# Patient Record
Sex: FEMALE | Race: WHITE | Employment: UNEMPLOYED | URBAN - METROPOLITAN AREA
[De-identification: names, ages, dates, MRNs, and addresses within clinical notes are randomized per-mention and may not be internally consistent; named-entity substitution may affect disease eponyms.]

---

## 2019-04-29 ENCOUNTER — APPOINTMENT (OUTPATIENT)
Dept: CT IMAGING | Age: 84
End: 2019-04-29
Payer: MEDICARE

## 2019-04-29 ENCOUNTER — APPOINTMENT (OUTPATIENT)
Dept: GENERAL RADIOLOGY | Age: 84
End: 2019-04-29
Payer: MEDICARE

## 2019-04-29 ENCOUNTER — HOSPITAL ENCOUNTER (EMERGENCY)
Age: 84
Discharge: HOME | End: 2019-04-29
Attending: EMERGENCY MEDICINE
Payer: MEDICARE

## 2019-04-29 VITALS
DIASTOLIC BLOOD PRESSURE: 96 MMHG | HEIGHT: 60 IN | HEART RATE: 98 BPM | TEMPERATURE: 97.1 F | OXYGEN SATURATION: 97 % | WEIGHT: 160 LBS | BODY MASS INDEX: 31.41 KG/M2 | RESPIRATION RATE: 20 BRPM | SYSTOLIC BLOOD PRESSURE: 157 MMHG
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
## 2019-04-29 DIAGNOSIS — S80.01XA CONTUSION OF RIGHT KNEE, INITIAL ENCOUNTER: ICD-10-CM

## 2019-04-29 DIAGNOSIS — S00.83XA CONTUSION OF FACE, INITIAL ENCOUNTER: ICD-10-CM

## 2019-04-29 DIAGNOSIS — S09.90XA CLOSED HEAD INJURY, INITIAL ENCOUNTER: ICD-10-CM

## 2019-04-29 PROCEDURE — 99284 EMERGENCY DEPT VISIT MOD MDM: CPT

## 2019-04-29 PROCEDURE — 93005 ELECTROCARDIOGRAM TRACING: CPT | Performed by: PHYSICIAN ASSISTANT

## 2019-04-29 PROCEDURE — 73564 X-RAY EXAM KNEE 4 OR MORE: CPT

## 2019-04-29 PROCEDURE — 6370000000 HC RX 637 (ALT 250 FOR IP): Performed by: PHYSICIAN ASSISTANT

## 2019-04-29 PROCEDURE — 72125 CT NECK SPINE W/O DYE: CPT

## 2019-04-29 PROCEDURE — 70450 CT HEAD/BRAIN W/O DYE: CPT

## 2019-04-29 RX ORDER — DIAPER,BRIEF,INFANT-TODD,DISP
EACH MISCELLANEOUS ONCE
Status: COMPLETED | OUTPATIENT
Start: 2019-04-29 | End: 2019-04-29

## 2019-04-29 RX ADMIN — BACITRACIN ZINC: 500 OINTMENT TOPICAL at 10:29

## 2019-04-29 ASSESSMENT — PAIN SCALES - GENERAL: PAINLEVEL_OUTOF10: 5

## 2019-04-29 ASSESSMENT — PAIN DESCRIPTION - PAIN TYPE: TYPE: ACUTE PAIN

## 2019-04-29 ASSESSMENT — PAIN DESCRIPTION - LOCATION: LOCATION: HEAD

## 2019-04-29 NOTE — ED NOTES
Discharge instructions given and pt verbalized understanding. Gait steady. No distress noted.      Tim Morrison, MICHEL  04/29/19 3665

## 2019-04-29 NOTE — PROGRESS NOTES
Patient's jewelry removed and placed in an envelope. Envelope given to patient. Patient left CT department with jewelry. ( 1 pair of earrings,necklace w/ charm)

## 2019-04-29 NOTE — ED PROVIDER NOTES
ED Attending  CC: Malini     Department of Emergency Medicine   ED  Provider Note  Admit Date/RoomTime: 4/29/2019  9:05 AM  ED Room: 21/21  Chief Complaint   Fall (lost balance and fell in kitchen hitting head on floor. denies LOC)    History of Present Illness   Source of history provided by:  patient. History/Exam Limitations: none. Ammon Waller is a 80 y.o. old female who has a past medical history of:   Past Medical History:   Diagnosis Date    Hypertension     presents to the emergency department by private vehicle, ambulatory and accompanied by family members, for a fall which occured 1 hour(s) prior to arrival. She was reportedly walking while at home prior to incident with complaints of right forehead contusion. Since onset the symptoms have been moderate in degree. Her pain is aggraveated by movement, use and palpation and relieved by nothing. She denies any loss of consciousness, neck pain, chest pain, abdominal pain, numbness or weakness. Patient denies any blood thinners. No LOC. Remona Mellow ROS    Pertinent positives and negatives are stated within HPI, all other systems reviewed and are negative. Past Surgical History:   Procedure Laterality Date    JOINT REPLACEMENT     Social History:  reports that she has never smoked. She has never used smokeless tobacco. She reports that she does not drink alcohol or use drugs. Family History: family history is not on file. Allergies: Patient has no known allergies. Physical Exam   Oxygen Saturation Interpretation: Normal.  ED Triage Vitals   BP Temp Temp Source Pulse Resp SpO2 Height Weight   04/29/19 0907 04/29/19 0859 04/29/19 0907 04/29/19 0859 04/29/19 0907 04/29/19 0859 04/29/19 0907 04/29/19 0907   (!) 183/111 97.5 °F (36.4 °C) Infrared 103 20 97 % 5' (1.524 m) 160 lb (72.6 kg)       Physical Exam  · Constitutional:  Alert, development consistent with age. · HEENT:  NC/NT. ARA. EOMI. Right frontal cephalohematoma with superficial abrasion. Airway patent. · Neck:  No midline or paravertebral tenderness. Normal ROM. Supple. · Chest:  Symmetrical without visible rash or tenderness. · Respiratory:  Lungs Clear to auscultation and breath sounds equal.  · CV:  Regular rate and rhythm, normal heart sounds, without pathological murmurs, ectopy, gallops, or rubs. · GI:  Abdomen Soft, nontender, good bowel sounds. No firm or pulsatile mass. · Pelvis:  Stable, nontender to palpation. · Back:  No midline or paravertebral tenderness. No costovertebral tenderness. · Extremities: No tenderness or edema noted. · Integument:  Normal turgor. Warm, dry, without visible rash, unless noted elsewhere. · Lymphatic: no lymphadenopathy noted  · Neurological:  Oriented x3, GCS 15. Motor functions intact. Lab / Imaging Results   (All laboratory and radiology results have been personally reviewed by myself)  Labs:  Results for orders placed or performed during the hospital encounter of 04/29/19   EKG 12 Lead   Result Value Ref Range    Ventricular Rate 95 BPM    Atrial Rate 95 BPM    P-R Interval 158 ms    QRS Duration 134 ms    Q-T Interval 388 ms    QTc Calculation (Bazett) 487 ms    P Axis 93 degrees    R Axis -14 degrees    T Axis 36 degrees       Imaging: All Radiology results interpreted by Radiologist unless otherwise noted. XR KNEE RIGHT (MIN 4 VIEWS)   Final Result      NO ACUTE FRACTURE OR DISLOCATION OF THE RIGHT KNEE      Status post ORIF with intramedullary muriel seen extending into the right   knee joint. This is unchanged from prior study. Tricompartmental osteoarthropathy with chondrocalcinosis most   pronounced in the patellofemoral compartment. .         CT Head WO Contrast   Final Result      NO ACUTE INTRACRANIAL PROCESS      Right periorbital and frontal scalp soft tissue swelling with hematoma         CT Cervical Spine WO Contrast   Final Result      NO ACUTE FRACTURE IDENTIFIED.       THERE IS A GRADE 1 ANTEROLISTHESIS OF C7 OVER T1 THERE IS SEVERE MULTILEVEL OSTEOARTHRITIC CHANGES AND DISC DISEASE   WITH SPINAL CANAL STENOSIS AND FORAMINAL NARROWING. .            EKG #1:  Interpreted by emergency department physician unless otherwise noted. Time:  0910    Rate: 95  Rhythm: Sinus, few PACs and with Right BBB. Interpretation: normal sinus rhythm, RBBB, PAC's noted, unchanged from previous tracings. ED Course / Medical Decision Making     Medications   bacitracin zinc ointment ( Topical Given 4/29/19 1029)     ED Course as of Apr 29 1756 Mon Apr 29, 2019   0922 EKG: This EKG is signed and interpreted by me. Rate: 95  Rhythm: Sinus  Interpretation: right bundle branch block, PACs  Comparison: stable as compared to patient's most recent EKG      [JA]      ED Course User Index  [JA] Roberto Gross MD     Consult(s):   None    Procedure(s):   none    MDM:   Patient in no acute distress. Vital signs stable. Patients's mechanical fall. Denies any chest pain or dizziness. Not on any blood thinners. No loss of consciousness. Imaging obtained and negative for acute process. Dr. Patricia Allen evaluated the patient. Patient will follow with PCP. Educated on the signs and symptoms to return to the ED. Discharged in stable condition. Counseling: The emergency provider has spoken with the patient and discussed todays results, in addition to providing specific details for the plan of care and counseling regarding the diagnosis and prognosis. Questions are answered at this time and they are agreeable with the plan. Assessment      1. Fall, initial encounter    2. Closed head injury, initial encounter    3. Contusion of face, initial encounter    4.  Contusion of right knee, initial encounter      Plan   Discharge to home  Patient condition is good    New Medications     Discharge Medication List as of 4/29/2019 10:36 AM        Electronically signed by STACY Benavidez   DD: 4/29/19  **This report was transcribed using voice recognition software. Every effort was made to ensure accuracy; however, inadvertent computerized transcription errors may be present.   END OF ED PROVIDER NOTE       John De La Torre  04/29/19 4424

## 2019-04-30 LAB
EKG ATRIAL RATE: 95 BPM
EKG P AXIS: 93 DEGREES
EKG P-R INTERVAL: 158 MS
EKG Q-T INTERVAL: 388 MS
EKG QRS DURATION: 134 MS
EKG QTC CALCULATION (BAZETT): 487 MS
EKG R AXIS: -14 DEGREES
EKG T AXIS: 36 DEGREES
EKG VENTRICULAR RATE: 95 BPM

## 2019-04-30 PROCEDURE — 93010 ELECTROCARDIOGRAM REPORT: CPT | Performed by: INTERNAL MEDICINE

## 2019-10-04 ENCOUNTER — HOSPITAL ENCOUNTER (OUTPATIENT)
Age: 84
Discharge: HOME | End: 2019-10-06

## 2019-10-04 PROCEDURE — 88305 TISSUE EXAM BY PATHOLOGIST: CPT

## 2019-10-04 PROCEDURE — 88331 PATH CONSLTJ SURG 1 BLK 1SPC: CPT

## 2023-08-22 ENCOUNTER — HOSPITAL ENCOUNTER (OUTPATIENT)
Age: 88
Discharge: HOME | End: 2023-08-24

## 2023-08-22 PROCEDURE — 88305 TISSUE EXAM BY PATHOLOGIST: CPT

## 2023-08-25 LAB — SURGICAL PATHOLOGY REPORT: NORMAL

## 2025-06-16 ENCOUNTER — HOSPITAL ENCOUNTER (INPATIENT)
Age: 89
LOS: 2 days | Discharge: HOME | DRG: 308 | End: 2025-06-18
Attending: EMERGENCY MEDICINE | Admitting: INTERNAL MEDICINE
Payer: MEDICARE

## 2025-06-16 ENCOUNTER — APPOINTMENT (OUTPATIENT)
Dept: GENERAL RADIOLOGY | Age: 89
DRG: 308 | End: 2025-06-16
Payer: MEDICARE

## 2025-06-16 DIAGNOSIS — I50.9 CONGESTIVE HEART FAILURE, UNSPECIFIED HF CHRONICITY, UNSPECIFIED HEART FAILURE TYPE (HCC): ICD-10-CM

## 2025-06-16 DIAGNOSIS — I48.91 NEW ONSET ATRIAL FIBRILLATION (HCC): Primary | ICD-10-CM

## 2025-06-16 LAB
ALBUMIN SERPL-MCNC: 4 G/DL (ref 3.5–5.2)
ALP SERPL-CCNC: 57 U/L (ref 35–104)
ALT SERPL-CCNC: 6 U/L (ref 0–35)
ANION GAP SERPL CALCULATED.3IONS-SCNC: 14 MMOL/L (ref 7–16)
AST SERPL-CCNC: 20 U/L (ref 0–35)
BASOPHILS # BLD: 0.05 K/UL (ref 0–0.2)
BASOPHILS NFR BLD: 1 % (ref 0–2)
BILIRUB SERPL-MCNC: 0.6 MG/DL (ref 0–1.2)
BNP SERPL-MCNC: 1170 PG/ML (ref 0–450)
BUN SERPL-MCNC: 23 MG/DL (ref 8–23)
CALCIUM SERPL-MCNC: 9.8 MG/DL (ref 8.8–10.2)
CHLORIDE SERPL-SCNC: 107 MMOL/L (ref 98–107)
CO2 SERPL-SCNC: 20 MMOL/L (ref 22–29)
CREAT SERPL-MCNC: 0.7 MG/DL (ref 0.5–1)
EOSINOPHIL # BLD: 0.12 K/UL (ref 0.05–0.5)
EOSINOPHILS RELATIVE PERCENT: 1 % (ref 0–6)
ERYTHROCYTE [DISTWIDTH] IN BLOOD BY AUTOMATED COUNT: 12.9 % (ref 11.5–15)
GFR, ESTIMATED: 75 ML/MIN/1.73M2
GLUCOSE SERPL-MCNC: 107 MG/DL (ref 74–99)
HCT VFR BLD AUTO: 37.6 % (ref 34–48)
HGB BLD-MCNC: 12.8 G/DL (ref 11.5–15.5)
IMM GRANULOCYTES # BLD AUTO: 0.03 K/UL (ref 0–0.58)
IMM GRANULOCYTES NFR BLD: 0 % (ref 0–5)
LYMPHOCYTES NFR BLD: 1.14 K/UL (ref 1.5–4)
LYMPHOCYTES RELATIVE PERCENT: 13 % (ref 20–42)
MCH RBC QN AUTO: 32.4 PG (ref 26–35)
MCHC RBC AUTO-ENTMCNC: 34 G/DL (ref 32–34.5)
MCV RBC AUTO: 95.2 FL (ref 80–99.9)
MONOCYTES NFR BLD: 0.73 K/UL (ref 0.1–0.95)
MONOCYTES NFR BLD: 8 % (ref 2–12)
NEUTROPHILS NFR BLD: 76 % (ref 43–80)
NEUTS SEG NFR BLD: 6.63 K/UL (ref 1.8–7.3)
PLATELET # BLD AUTO: 287 K/UL (ref 130–450)
PMV BLD AUTO: 9.3 FL (ref 7–12)
POTASSIUM SERPL-SCNC: 3.7 MMOL/L (ref 3.5–5.1)
PROT SERPL-MCNC: 7.2 G/DL (ref 6.4–8.3)
RBC # BLD AUTO: 3.95 M/UL (ref 3.5–5.5)
SODIUM SERPL-SCNC: 140 MMOL/L (ref 136–145)
TROPONIN I SERPL HS-MCNC: 27 NG/L (ref 0–14)
TROPONIN I SERPL HS-MCNC: 30 NG/L (ref 0–14)
WBC OTHER # BLD: 8.7 K/UL (ref 4.5–11.5)

## 2025-06-16 PROCEDURE — 2060000000 HC ICU INTERMEDIATE R&B

## 2025-06-16 PROCEDURE — 99285 EMERGENCY DEPT VISIT HI MDM: CPT

## 2025-06-16 PROCEDURE — 80061 LIPID PANEL: CPT

## 2025-06-16 PROCEDURE — 93005 ELECTROCARDIOGRAM TRACING: CPT | Performed by: EMERGENCY MEDICINE

## 2025-06-16 PROCEDURE — 85025 COMPLETE CBC W/AUTO DIFF WBC: CPT

## 2025-06-16 PROCEDURE — 80053 COMPREHEN METABOLIC PANEL: CPT

## 2025-06-16 PROCEDURE — 84443 ASSAY THYROID STIM HORMONE: CPT

## 2025-06-16 PROCEDURE — 83880 ASSAY OF NATRIURETIC PEPTIDE: CPT

## 2025-06-16 PROCEDURE — 84484 ASSAY OF TROPONIN QUANT: CPT

## 2025-06-16 PROCEDURE — 71045 X-RAY EXAM CHEST 1 VIEW: CPT

## 2025-06-16 PROCEDURE — 83735 ASSAY OF MAGNESIUM: CPT

## 2025-06-16 PROCEDURE — 84439 ASSAY OF FREE THYROXINE: CPT

## 2025-06-16 ASSESSMENT — PAIN - FUNCTIONAL ASSESSMENT: PAIN_FUNCTIONAL_ASSESSMENT: NONE - DENIES PAIN

## 2025-06-16 NOTE — ED PROVIDER NOTES
Department of Emergency Medicine   ED  Provider Note  Admit Date/RoomTime: 6/16/2025  1:41 PM  ED Room: PR3/PR3          History of Present Illness:  6/16/25, Time: 6:35 PM EDT  Chief Complaint   Patient presents with    Abnormal EKG     Was scheduled for lumbar injection at [facility]. Preop EKG abnormal.                Deon Horvath is a 89 y.o. female presenting to the ED for abnormal EKG.  Patient was scheduled for routine lumbar injection at [facility].  Preop EKG was abnormal.  She says her legs been swollen over the past couple of months.  She also feels in her palpitations.  No fevers or chills.  No cough or sputum.  No paresthesias.  No numbness in the arms or legs otherwise.  No other symptoms or complaints.    Review of Systems:   Pertinent positives and negatives are stated within HPI, all other systems reviewed and are negative.        --------------------------------------------- PAST HISTORY ---------------------------------------------  Past Medical History:  has a past medical history of Hypertension.    Past Surgical History:  has a past surgical history that includes joint replacement.    Social History:  reports that she has never smoked. She has never used smokeless tobacco. She reports that she does not drink alcohol and does not use drugs.    Family History: family history is not on file.. Unless otherwise noted, family history is non contributory    The patient’s home medications have been reviewed.    Allergies: Patient has no known allergies.        ---------------------------------------------------PHYSICAL EXAM--------------------------------------    Constitutional/General: Alert and oriented x3  Head: Normocephalic and atraumatic  Eyes: PERRL, EOMI, sclera non icteric  Mouth: Oropharynx clear, handling secretions, no trismus, no asymmetry of the posterior oropharynx or uvular edema  Neck: Supple, full ROM, no stridor, no meningeal signs  Respiratory: Lungs clear to auscultation

## 2025-06-17 ENCOUNTER — APPOINTMENT (OUTPATIENT)
Age: 89
DRG: 308 | End: 2025-06-17
Attending: INTERNAL MEDICINE
Payer: MEDICARE

## 2025-06-17 VITALS
OXYGEN SATURATION: 93 % | HEART RATE: 93 BPM | BODY MASS INDEX: 26.73 KG/M2 | SYSTOLIC BLOOD PRESSURE: 186 MMHG | HEIGHT: 59 IN | RESPIRATION RATE: 22 BRPM | TEMPERATURE: 97.9 F | DIASTOLIC BLOOD PRESSURE: 81 MMHG | WEIGHT: 132.6 LBS

## 2025-06-17 LAB
CHOLEST SERPL-MCNC: 176 MG/DL
ECHO AO ASC DIAM: 3.5 CM
ECHO AO ASCENDING AORTA INDEX: 2.26 CM/M2
ECHO AV AREA PEAK VELOCITY: 2.1 CM2
ECHO AV AREA VTI: 2 CM2
ECHO AV AREA/BSA PEAK VELOCITY: 1.4 CM2/M2
ECHO AV AREA/BSA VTI: 1.3 CM2/M2
ECHO AV CUSP MM: 1.7 CM
ECHO AV MEAN GRADIENT: 5 MMHG
ECHO AV MEAN VELOCITY: 1 M/S
ECHO AV PEAK GRADIENT: 8 MMHG
ECHO AV PEAK VELOCITY: 1.5 M/S
ECHO AV VELOCITY RATIO: 0.73
ECHO AV VTI: 29.3 CM
ECHO EST RA PRESSURE: 3 MMHG
ECHO LA DIAMETER INDEX: 2.84 CM/M2
ECHO LA DIAMETER: 4.4 CM
ECHO LA VOL A-L A2C: 81 ML (ref 22–52)
ECHO LA VOL A-L A4C: 50 ML (ref 22–52)
ECHO LA VOL MOD A2C: 73 ML (ref 22–52)
ECHO LA VOL MOD A4C: 46 ML (ref 22–52)
ECHO LA VOLUME AREA LENGTH: 70 ML
ECHO LA VOLUME INDEX A-L A2C: 52 ML/M2 (ref 16–34)
ECHO LA VOLUME INDEX A-L A4C: 32 ML/M2 (ref 16–34)
ECHO LA VOLUME INDEX AREA LENGTH: 45 ML/M2 (ref 16–34)
ECHO LA VOLUME INDEX MOD A2C: 47 ML/M2 (ref 16–34)
ECHO LA VOLUME INDEX MOD A4C: 30 ML/M2 (ref 16–34)
ECHO LV E' LATERAL VELOCITY: 6 CM/S
ECHO LV E' SEPTAL VELOCITY: 5 CM/S
ECHO LV EF PHYSICIAN: 60 %
ECHO LV FRACTIONAL SHORTENING: 32 % (ref 28–44)
ECHO LV INTERNAL DIMENSION DIASTOLE INDEX: 2.65 CM/M2
ECHO LV INTERNAL DIMENSION DIASTOLIC: 4.1 CM (ref 3.9–5.3)
ECHO LV INTERNAL DIMENSION SYSTOLIC INDEX: 1.81 CM/M2
ECHO LV INTERNAL DIMENSION SYSTOLIC: 2.8 CM
ECHO LV ISOVOLUMETRIC RELAXATION TIME (IVRT): 96.9 MS
ECHO LV IVSD: 0.8 CM (ref 0.6–0.9)
ECHO LV IVSS: 1.2 CM
ECHO LV MASS 2D: 114.1 G (ref 67–162)
ECHO LV MASS INDEX 2D: 73.6 G/M2 (ref 43–95)
ECHO LV POSTERIOR WALL DIASTOLIC: 1 CM (ref 0.6–0.9)
ECHO LV POSTERIOR WALL SYSTOLIC: 1.4 CM
ECHO LV RELATIVE WALL THICKNESS RATIO: 0.49
ECHO LVOT AREA: 2.8 CM2
ECHO LVOT AV VTI INDEX: 0.68
ECHO LVOT DIAM: 1.9 CM
ECHO LVOT MEAN GRADIENT: 2 MMHG
ECHO LVOT PEAK GRADIENT: 5 MMHG
ECHO LVOT PEAK VELOCITY: 1.1 M/S
ECHO LVOT STROKE VOLUME INDEX: 36.6 ML/M2
ECHO LVOT SV: 56.7 ML
ECHO LVOT VTI: 20 CM
ECHO MV "A" WAVE DURATION: 115.3 MSEC
ECHO MV A VELOCITY: 1.07 M/S
ECHO MV AREA PHT: 3.8 CM2
ECHO MV AREA VTI: 2.5 CM2
ECHO MV E DECELERATION TIME (DT): 215.8 MS
ECHO MV E VELOCITY: 1.08 M/S
ECHO MV E/A RATIO: 1.01
ECHO MV E/E' LATERAL: 18
ECHO MV E/E' RATIO (AVERAGED): 19.8
ECHO MV E/E' SEPTAL: 21.6
ECHO MV LVOT VTI INDEX: 1.15
ECHO MV MAX VELOCITY: 1 M/S
ECHO MV MEAN GRADIENT: 3 MMHG
ECHO MV MEAN VELOCITY: 0.8 M/S
ECHO MV PEAK GRADIENT: 4 MMHG
ECHO MV PRESSURE HALF TIME (PHT): 57.7 MS
ECHO MV VTI: 22.9 CM
ECHO PV MAX VELOCITY: 1.2 M/S
ECHO PV MEAN GRADIENT: 3 MMHG
ECHO PV MEAN VELOCITY: 0.8 M/S
ECHO PV PEAK GRADIENT: 5 MMHG
ECHO PV VTI: 23.5 CM
ECHO PVEIN A DURATION: 143 MS
ECHO PVEIN A VELOCITY: 0.3 M/S
ECHO PVEIN PEAK D VELOCITY: 0.4 M/S
ECHO PVEIN PEAK S VELOCITY: 0.5 M/S
ECHO PVEIN S/D RATIO: 1.3
ECHO RIGHT VENTRICULAR SYSTOLIC PRESSURE (RVSP): 31 MMHG
ECHO RV INTERNAL DIMENSION: 4.5 CM
ECHO RV TAPSE: 3 CM (ref 1.7–?)
ECHO TV REGURGITANT MAX VELOCITY: 2.66 M/S
ECHO TV REGURGITANT PEAK GRADIENT: 28 MMHG
EKG ATRIAL RATE: 101 BPM
EKG ATRIAL RATE: 85 BPM
EKG P AXIS: 64 DEGREES
EKG P-R INTERVAL: 220 MS
EKG Q-T INTERVAL: 376 MS
EKG Q-T INTERVAL: 434 MS
EKG QRS DURATION: 140 MS
EKG QRS DURATION: 148 MS
EKG QTC CALCULATION (BAZETT): 482 MS
EKG QTC CALCULATION (BAZETT): 516 MS
EKG R AXIS: -38 DEGREES
EKG R AXIS: 126 DEGREES
EKG T AXIS: -14 DEGREES
EKG T AXIS: 32 DEGREES
EKG VENTRICULAR RATE: 85 BPM
EKG VENTRICULAR RATE: 99 BPM
HDLC SERPL-MCNC: 50 MG/DL
LDLC SERPL CALC-MCNC: 103 MG/DL
MAGNESIUM SERPL-MCNC: 2 MG/DL (ref 1.6–2.4)
T4 FREE SERPL-MCNC: 1.2 NG/DL (ref 0.9–1.7)
TRIGL SERPL-MCNC: 114 MG/DL
TSH SERPL DL<=0.05 MIU/L-ACNC: 1.51 UIU/ML (ref 0.27–4.2)
VLDLC SERPL CALC-MCNC: 23 MG/DL

## 2025-06-17 PROCEDURE — 6370000000 HC RX 637 (ALT 250 FOR IP): Performed by: INTERNAL MEDICINE

## 2025-06-17 PROCEDURE — 93010 ELECTROCARDIOGRAM REPORT: CPT | Performed by: INTERNAL MEDICINE

## 2025-06-17 PROCEDURE — 6360000002 HC RX W HCPCS: Performed by: INTERNAL MEDICINE

## 2025-06-17 PROCEDURE — 99222 1ST HOSP IP/OBS MODERATE 55: CPT | Performed by: INTERNAL MEDICINE

## 2025-06-17 PROCEDURE — 97165 OT EVAL LOW COMPLEX 30 MIN: CPT

## 2025-06-17 PROCEDURE — APPSS60 APP SPLIT SHARED TIME 46-60 MINUTES

## 2025-06-17 PROCEDURE — 2500000003 HC RX 250 WO HCPCS: Performed by: INTERNAL MEDICINE

## 2025-06-17 PROCEDURE — 93306 TTE W/DOPPLER COMPLETE: CPT

## 2025-06-17 PROCEDURE — 2060000000 HC ICU INTERMEDIATE R&B

## 2025-06-17 PROCEDURE — 93306 TTE W/DOPPLER COMPLETE: CPT | Performed by: INTERNAL MEDICINE

## 2025-06-17 PROCEDURE — 93005 ELECTROCARDIOGRAM TRACING: CPT | Performed by: INTERNAL MEDICINE

## 2025-06-17 PROCEDURE — 97535 SELF CARE MNGMENT TRAINING: CPT

## 2025-06-17 RX ORDER — POLYETHYLENE GLYCOL 3350 17 G/17G
17 POWDER, FOR SOLUTION ORAL DAILY PRN
Status: DISCONTINUED | OUTPATIENT
Start: 2025-06-17 | End: 2025-06-18 | Stop reason: HOSPADM

## 2025-06-17 RX ORDER — OXYBUTYNIN CHLORIDE 5 MG/1
5 TABLET, EXTENDED RELEASE ORAL DAILY
Status: ON HOLD | COMMUNITY
End: 2025-06-18 | Stop reason: HOSPADM

## 2025-06-17 RX ORDER — ONDANSETRON 4 MG/1
4 TABLET, ORALLY DISINTEGRATING ORAL EVERY 8 HOURS PRN
Status: DISCONTINUED | OUTPATIENT
Start: 2025-06-17 | End: 2025-06-18 | Stop reason: HOSPADM

## 2025-06-17 RX ORDER — HALOPERIDOL 5 MG/ML
2 INJECTION INTRAMUSCULAR ONCE
Status: COMPLETED | OUTPATIENT
Start: 2025-06-17 | End: 2025-06-17

## 2025-06-17 RX ORDER — POTASSIUM CHLORIDE 7.45 MG/ML
10 INJECTION INTRAVENOUS PRN
Status: DISCONTINUED | OUTPATIENT
Start: 2025-06-17 | End: 2025-06-18 | Stop reason: HOSPADM

## 2025-06-17 RX ORDER — FUROSEMIDE 20 MG/1
40 TABLET ORAL DAILY
Status: DISCONTINUED | OUTPATIENT
Start: 2025-06-18 | End: 2025-06-18 | Stop reason: HOSPADM

## 2025-06-17 RX ORDER — HYDROCODONE BITARTRATE AND ACETAMINOPHEN 5; 325 MG/1; MG/1
1 TABLET ORAL EVERY 6 HOURS PRN
Status: DISCONTINUED | OUTPATIENT
Start: 2025-06-17 | End: 2025-06-18 | Stop reason: HOSPADM

## 2025-06-17 RX ORDER — POTASSIUM CHLORIDE 1500 MG/1
40 TABLET, EXTENDED RELEASE ORAL PRN
Status: DISCONTINUED | OUTPATIENT
Start: 2025-06-17 | End: 2025-06-18 | Stop reason: HOSPADM

## 2025-06-17 RX ORDER — DOCUSATE SODIUM 100 MG/1
100 CAPSULE, LIQUID FILLED ORAL DAILY PRN
Status: DISCONTINUED | OUTPATIENT
Start: 2025-06-17 | End: 2025-06-18 | Stop reason: HOSPADM

## 2025-06-17 RX ORDER — HYDRALAZINE HYDROCHLORIDE 25 MG/1
25 TABLET, FILM COATED ORAL EVERY 8 HOURS SCHEDULED
Status: DISCONTINUED | OUTPATIENT
Start: 2025-06-17 | End: 2025-06-18 | Stop reason: HOSPADM

## 2025-06-17 RX ORDER — SODIUM CHLORIDE 0.9 % (FLUSH) 0.9 %
5-40 SYRINGE (ML) INJECTION PRN
Status: DISCONTINUED | OUTPATIENT
Start: 2025-06-17 | End: 2025-06-18 | Stop reason: HOSPADM

## 2025-06-17 RX ORDER — METOPROLOL SUCCINATE 50 MG/1
50 TABLET, EXTENDED RELEASE ORAL DAILY
Status: DISCONTINUED | OUTPATIENT
Start: 2025-06-17 | End: 2025-06-18 | Stop reason: HOSPADM

## 2025-06-17 RX ORDER — SPIRONOLACTONE 25 MG/1
25 TABLET ORAL DAILY
Status: DISCONTINUED | OUTPATIENT
Start: 2025-06-17 | End: 2025-06-18 | Stop reason: HOSPADM

## 2025-06-17 RX ORDER — HYDROCODONE BITARTRATE AND ACETAMINOPHEN 5; 325 MG/1; MG/1
2 TABLET ORAL EVERY 6 HOURS PRN
Status: DISCONTINUED | OUTPATIENT
Start: 2025-06-17 | End: 2025-06-18 | Stop reason: HOSPADM

## 2025-06-17 RX ORDER — ACETAMINOPHEN 650 MG/1
650 SUPPOSITORY RECTAL EVERY 6 HOURS PRN
Status: DISCONTINUED | OUTPATIENT
Start: 2025-06-17 | End: 2025-06-18 | Stop reason: HOSPADM

## 2025-06-17 RX ORDER — SODIUM CHLORIDE 0.9 % (FLUSH) 0.9 %
5-40 SYRINGE (ML) INJECTION EVERY 12 HOURS SCHEDULED
Status: DISCONTINUED | OUTPATIENT
Start: 2025-06-17 | End: 2025-06-18 | Stop reason: HOSPADM

## 2025-06-17 RX ORDER — ACETAMINOPHEN 325 MG/1
650 TABLET ORAL EVERY 6 HOURS PRN
COMMUNITY

## 2025-06-17 RX ORDER — OXYBUTYNIN CHLORIDE 2.5 MG/1
2.5 TABLET ORAL DAILY
COMMUNITY

## 2025-06-17 RX ORDER — FERROUS SULFATE 325(65) MG
325 TABLET ORAL 2 TIMES DAILY WITH MEALS
Status: DISCONTINUED | OUTPATIENT
Start: 2025-06-17 | End: 2025-06-18 | Stop reason: HOSPADM

## 2025-06-17 RX ORDER — FUROSEMIDE 10 MG/ML
40 INJECTION INTRAMUSCULAR; INTRAVENOUS ONCE
Status: COMPLETED | OUTPATIENT
Start: 2025-06-17 | End: 2025-06-17

## 2025-06-17 RX ORDER — SODIUM CHLORIDE 9 MG/ML
INJECTION, SOLUTION INTRAVENOUS PRN
Status: DISCONTINUED | OUTPATIENT
Start: 2025-06-17 | End: 2025-06-18 | Stop reason: HOSPADM

## 2025-06-17 RX ORDER — GABAPENTIN 300 MG/1
300 CAPSULE ORAL 2 TIMES DAILY
Status: DISCONTINUED | OUTPATIENT
Start: 2025-06-17 | End: 2025-06-18 | Stop reason: HOSPADM

## 2025-06-17 RX ORDER — MAGNESIUM SULFATE IN WATER 40 MG/ML
2000 INJECTION, SOLUTION INTRAVENOUS PRN
Status: DISCONTINUED | OUTPATIENT
Start: 2025-06-17 | End: 2025-06-18 | Stop reason: HOSPADM

## 2025-06-17 RX ORDER — ONDANSETRON 2 MG/ML
4 INJECTION INTRAMUSCULAR; INTRAVENOUS EVERY 6 HOURS PRN
Status: DISCONTINUED | OUTPATIENT
Start: 2025-06-17 | End: 2025-06-18 | Stop reason: HOSPADM

## 2025-06-17 RX ORDER — ENALAPRIL MALEATE 10 MG/1
10 TABLET ORAL 2 TIMES DAILY
Status: DISCONTINUED | OUTPATIENT
Start: 2025-06-17 | End: 2025-06-18 | Stop reason: HOSPADM

## 2025-06-17 RX ORDER — ACETAMINOPHEN 325 MG/1
650 TABLET ORAL EVERY 6 HOURS PRN
Status: DISCONTINUED | OUTPATIENT
Start: 2025-06-17 | End: 2025-06-18 | Stop reason: HOSPADM

## 2025-06-17 RX ORDER — MELOXICAM 15 MG/1
15 TABLET ORAL DAILY
Status: ON HOLD | COMMUNITY
End: 2025-06-18 | Stop reason: HOSPADM

## 2025-06-17 RX ORDER — CLONIDINE HYDROCHLORIDE 0.1 MG/1
0.1 TABLET ORAL EVERY 4 HOURS PRN
Status: DISCONTINUED | OUTPATIENT
Start: 2025-06-17 | End: 2025-06-18 | Stop reason: HOSPADM

## 2025-06-17 RX ORDER — POTASSIUM CHLORIDE 750 MG/1
10 TABLET, EXTENDED RELEASE ORAL DAILY
Status: ON HOLD | COMMUNITY
End: 2025-06-18 | Stop reason: HOSPADM

## 2025-06-17 RX ORDER — PREGABALIN 50 MG/1
50 CAPSULE ORAL 2 TIMES DAILY
COMMUNITY

## 2025-06-17 RX ORDER — HYDROCHLOROTHIAZIDE 12.5 MG/1
12.5 TABLET ORAL DAILY
Status: ON HOLD | COMMUNITY
End: 2025-06-18 | Stop reason: HOSPADM

## 2025-06-17 RX ORDER — ASPIRIN 81 MG/1
81 TABLET, CHEWABLE ORAL DAILY
Status: DISCONTINUED | OUTPATIENT
Start: 2025-06-17 | End: 2025-06-17

## 2025-06-17 RX ORDER — ENOXAPARIN SODIUM 100 MG/ML
40 INJECTION SUBCUTANEOUS DAILY
Status: DISCONTINUED | OUTPATIENT
Start: 2025-06-17 | End: 2025-06-17

## 2025-06-17 RX ADMIN — ENALAPRIL MALEATE 10 MG: 10 TABLET ORAL at 20:45

## 2025-06-17 RX ADMIN — HYDROCODONE BITARTRATE AND ACETAMINOPHEN 1 TABLET: 5; 325 TABLET ORAL at 20:45

## 2025-06-17 RX ADMIN — FUROSEMIDE 40 MG: 10 INJECTION, SOLUTION INTRAMUSCULAR; INTRAVENOUS at 13:32

## 2025-06-17 RX ADMIN — ASPIRIN 81 MG CHEWABLE TABLET 81 MG: 81 TABLET CHEWABLE at 08:44

## 2025-06-17 RX ADMIN — ACETAMINOPHEN 650 MG: 325 TABLET ORAL at 08:53

## 2025-06-17 RX ADMIN — APIXABAN 5 MG: 5 TABLET, FILM COATED ORAL at 16:48

## 2025-06-17 RX ADMIN — Medication 325 MG: at 16:48

## 2025-06-17 RX ADMIN — SODIUM CHLORIDE, PRESERVATIVE FREE 10 ML: 5 INJECTION INTRAVENOUS at 20:44

## 2025-06-17 RX ADMIN — ENOXAPARIN SODIUM 40 MG: 100 INJECTION SUBCUTANEOUS at 08:44

## 2025-06-17 RX ADMIN — HYDRALAZINE HYDROCHLORIDE 25 MG: 25 TABLET ORAL at 20:45

## 2025-06-17 RX ADMIN — APIXABAN 5 MG: 5 TABLET, FILM COATED ORAL at 22:24

## 2025-06-17 RX ADMIN — SPIRONOLACTONE 25 MG: 25 TABLET ORAL at 13:33

## 2025-06-17 RX ADMIN — SODIUM CHLORIDE, PRESERVATIVE FREE 10 ML: 5 INJECTION INTRAVENOUS at 08:44

## 2025-06-17 RX ADMIN — HYDRALAZINE HYDROCHLORIDE 25 MG: 25 TABLET ORAL at 16:48

## 2025-06-17 RX ADMIN — Medication 325 MG: at 08:44

## 2025-06-17 RX ADMIN — HALOPERIDOL LACTATE 2 MG: 5 INJECTION, SOLUTION INTRAMUSCULAR at 21:19

## 2025-06-17 RX ADMIN — METOPROLOL SUCCINATE 50 MG: 50 TABLET, EXTENDED RELEASE ORAL at 13:33

## 2025-06-17 ASSESSMENT — PAIN DESCRIPTION - DESCRIPTORS
DESCRIPTORS: ACHING
DESCRIPTORS: ACHING

## 2025-06-17 ASSESSMENT — PAIN SCALES - GENERAL
PAINLEVEL_OUTOF10: 3
PAINLEVEL_OUTOF10: 5
PAINLEVEL_OUTOF10: 0

## 2025-06-17 ASSESSMENT — PAIN DESCRIPTION - LOCATION
LOCATION: BACK
LOCATION: GENERALIZED

## 2025-06-17 NOTE — PROGRESS NOTES
OCCUPATIONAL THERAPY INITIAL EVALUATION    [facility]  [address], [place]      Date:2025                                                  Patient Name: Deon Horvath  MRN: [id number]  : 12/15/1929  Room: [address] [place], [place] [address]    Evaluating OT: ANA Donahue, OTR/L  # [id number]    Referring Provider:  Phoenix Loving DO   Specific Provider Orders:  \"OT Eval and Treat\"  25    Diagnosis: New onset atrial fibrillation (HCC) [I48.91]  New onset a-fib (HCC) [I48.91]  Congestive heart failure, unspecified HF chronicity, unspecified heart failure type (HCC) [I50.9]    Pt was admitted after being found in A Fib at outside facility    Pertinent Medical History:  Pt has a past medical history of Hypertension.,  has a past surgical history that includes joint replacement.    Surgeries this admission: None     Precautions:  Fall Risk  Cognition - Alarms  [facility]  Monitor BP    Assessment of current deficits   [x] Functional mobility                [x]ADLs  [x] Strength                 [x]Cognition   [x] Functional transfers              [x] IADLs         [x] Safety Awareness   [x]Endurance   [] Fine Coordination                 [x] Balance               [] Vision/perception     []Sensation    []Gross Motor Coordination     [] ROM  [] Delirium                   [] Motor Control       OT PLAN OF CARE   OT POC based on physician orders, patient diagnosis and results of clinical assessment    Frequency/Duration 2-5 days/wk for 2 weeks PRN   Specific OT Treatment to include:   * Instruction/training on adapted ADL techniques and AE recommendations to increase functional independence within precautions       * Training on energy conservation strategies, correct breathing pattern and techniques to improve independence/tolerance for self-care routine  * Functional transfer/mobility training/DME recommendations for increased independence, safety, and fall

## 2025-06-17 NOTE — CARE COORDINATION
Transition of care planning. Per chart review. She was scheduled for lumbar injection at [facility]. Her preop EKG was abnormal. She was sent to the emergency room. She was noted to be in atrial fibrillation. Cardiology consult echo PT OT.  Met with patient and daughter Heidi  @ bedside.Patient very [place]  She lives in 1 story ran home with Heidi( who has had a stoke) and patient son also stays there.Her pcp is Dr Maynor Painter and she uses Mandy Lucas RD.  She has a walker, no hhc has been to [facility].  Await PT/OT  Her plan is to return home. Electronically signed by Angelica Ordonez RN on 6/17/2025 at 9:42 AM

## 2025-06-17 NOTE — PROGRESS NOTES
4 Eyes Skin Assessment     NAME:  Deon Horvath  YOB: 1929  MEDICAL RECORD NUMBER:  [id number]    The patient is being assessed for  Admission    I agree that at least one RN has performed a thorough Head to Toe Skin Assessment on the patient. ALL assessment sites listed below have been assessed.      Areas assessed by both nurses:    Head, Face, Ears, Shoulders, Back, Chest, Arms, Elbows, Hands, Sacrum. Buttock, Coccyx, Ischium, and Legs. Feet and Heels        Does the Patient have a Wound? No noted wound(s)       Chris Prevention initiated by RN: No  Wound Care Orders initiated by RN: No    Pressure Injury (Stage 3,4, Unstageable, DTI, NWPT, and Complex wounds) if present, place Wound referral order by RN under : No    New Ostomies, if present place, Ostomy referral order under : No     Nurse 1 eSignature: Electronically signed by Nell Guerrero RN on 6/17/25 at 11:57 AM EDT    **SHARE this note so that the co-signing nurse can place an eSignature**    Nurse 2 eSignature: Electronically signed by Bibiana Witt RN on 6/17/25 at 12:43 PM EDT

## 2025-06-17 NOTE — CONSULTS
CARDIOLOGY ATTENDING ATTESTATION   I spent > 51% of the total time involved with completing the encounter. The total time included the following:  Independently interviewing the patient (HPI, ROS, PMH, PSH, FMH, SH, allergies, and medications)  Independently performing a medically appropriate examination  Reviewing the above documentation completed by the RACHEL  Ordering medications, tests, and/or procedures  Formulating the assessment/plan and reviewing the rationale for the above recommendations  Reviewing available records, results of all previously ordered testing/procedures, and current problem list  Counseling/educating the patient  Coordinating care with other healthcare professionals  Communicating results to the patient's family/caregiver  Documenting clinical information in the patient's electronic health record    Physical Exam   BP (!) 189/94   Pulse 90   Temp 98 °F (36.7 °C) (Oral)   Resp 18   SpO2 97%   Constitutional: Oriented to person, place, and time. Well-developed and well-nourished. No distress.    Head: Normocephalic and atraumatic.   Eyes: EOM are normal. Pupils are equal, round, and reactive to light.   Neck: Normal range of motion. Neck supple. No hepatojugular reflux and no JVD present. Carotid bruit is not present. No tracheal deviation present. No thyromegaly present.   Cardiovascular: Normal rate, regular rhythm, normal heart sounds and intact distal pulses.  Exam reveals no gallop and no friction rub.  No murmur heard.  Pulmonary/Chest: Effort normal and breath sounds normal. No respiratory distress. No wheezes. No rales. No tenderness.   Abdominal: Soft. Bowel sounds are normal. No distension and no mass. No tenderness. No rebound and no guarding.   Musculoskeletal: Normal range of motion. No edema and no tenderness.   Lymphadenopathy:   No cervical adenopathy.   Neurological: Alert and oriented to person, place, and time.   Skin: Skin is warm and dry. No rash noted. Not diaphoretic.

## 2025-06-17 NOTE — H&P
sounds.  Soft, nontender.  No rebound or guarding.  No hepatosplenomegaly.  No masses.  BACK:  With increased thoracic kyphosis.  EXTREMITIES:  With bilateral lower extremity edema.  LYMPH NODES:  No adenopathy noted.  SKIN:  Without rash or lesion.    IMPRESSION:  Atrial fibrillation with rapid ventricular response, osteoarthritis, hypertension, obesity.    PLAN:  Admit, cardiac telemetry, Cardiology to see.  2D echo.  Rate control.  Await for further recommendations from Cardiology.  Discharge plan, home when stable.          PARAS LUNA DO      D:  06/17/2025 08:03:42     T:  06/17/2025 08:58:55     MMM/AQS  Job #:  [id number]     Doc#:  [id number]

## 2025-06-17 NOTE — FLOWSHEET NOTE
06/17/25 1143   Belongings   Dental Appliances Uppers;Lowers   Vision - Corrective Lenses None   Hearing Aid None   Clothing Pants;Sweater;Footwear;Undergarments   Jewelry None   Body Piercings Removed N/A   Electronic Devices None   Weapons (Notify Protective Services/Security) None   Other Valuables At home   Home Medications None   Valuables Given To Patient   Provide Name(s) of Who Valuable(s) Were Given To SELF     Patient arrived with the above belongings

## 2025-06-17 NOTE — PROGRESS NOTES
Patient brought to room 36 from waiting room. Placed in gown and on bedside monitor. Updated on plan of care, daughter at bedside along with Dr. Loving. Call light within reach, bed locked and in lowest position.

## 2025-06-17 NOTE — CONSULTS
Inpatient Cardiology Consultation      Reason for Consult: A-fib with RVR    Consulting Physician: Dr. Barry    Requesting Physician: Dr. Loving    Date of Consultation: 6/17/2025    HISTORY OF PRESENT ILLNESS:   Deon Horvath  is a 89 y.o.  female not previously known to [facility] cardiology.         ED report: presented 6/16/2025 for abnormal EKG, palpitations and BLE swelling times couple months.   Arrival vitals: /100  afebrile O2 sat 98% RA  Labs: Troponin 27 > 30 NT proBNP 1170 (no prior) K3.7 BUN 23 CR 0.7 LFTs WNL WBCs 8.7 Hgb 12.8   RAD:   6/16 PCXR: Cardiomegaly with mild pulmonary vascular congestions.  Minimal right pleural effusion  ED treatment: ASA 81 Mg, Tylenol 650 Mg p.o., Lovenox 40 Mg subcu, iron sulfate 325 mg p.o.     Echo -pending    Patient seen and examined.  Recent vitals: /94 HR 90 afebrile O2 sat 97% RA no intake and output documented no weight documented    6/17/2025:   Assessed resting in bed, daughter at bedside.  Patient is a poor historian.  Alert to self and time, disoriented to place.  She reports while at orthopedic doctor's office for knee injections she was found to be hypotensive, due to this EKG was obtained which showed abnormal heart rhythm so she was instructed to report to ED for further evaluation.  Expresses occasional BELLE at baseline, no worse than normal.  Otherwise asymptomatic.  Lives at home with daughter.  Moderately active at baseline, ambulates with walker denies CP/SOB/lightheadedness with activity.  Denies significant cardiac history and does not follow with cardiology.  Appears mildly hypervolemic on assessment.    Please note: past medical records were reviewed per electronic medical record (EMR) - see detailed reports under Past Medical/ Surgical History.   Past Medical History:    Hypertension  DJD    No prior cardiac testing    Past Surgical History:    Past Surgical History:   Procedure Laterality Date    JOINT REPLACEMENT

## 2025-06-17 NOTE — ACP (ADVANCE CARE PLANNING)
Advance Care Planning   Healthcare Decision Maker:    Primary Decision Maker: Wendy Araiza - Child - [phone]    Click here to complete Healthcare Decision Makers including selection of the Healthcare Decision Maker Relationship (ie \"Primary\").

## 2025-06-18 LAB
ANION GAP SERPL CALCULATED.3IONS-SCNC: 15 MMOL/L (ref 7–16)
BUN SERPL-MCNC: 25 MG/DL (ref 8–23)
CALCIUM SERPL-MCNC: 10.1 MG/DL (ref 8.8–10.2)
CHLORIDE SERPL-SCNC: 105 MMOL/L (ref 98–107)
CO2 SERPL-SCNC: 23 MMOL/L (ref 22–29)
CREAT SERPL-MCNC: 0.7 MG/DL (ref 0.5–1)
GFR, ESTIMATED: 79 ML/MIN/1.73M2
GLUCOSE SERPL-MCNC: 115 MG/DL (ref 74–99)
POTASSIUM SERPL-SCNC: 3 MMOL/L (ref 3.5–5.1)
SODIUM SERPL-SCNC: 143 MMOL/L (ref 136–145)

## 2025-06-18 PROCEDURE — 97530 THERAPEUTIC ACTIVITIES: CPT

## 2025-06-18 PROCEDURE — 97161 PT EVAL LOW COMPLEX 20 MIN: CPT

## 2025-06-18 PROCEDURE — 36415 COLL VENOUS BLD VENIPUNCTURE: CPT

## 2025-06-18 PROCEDURE — 80048 BASIC METABOLIC PNL TOTAL CA: CPT

## 2025-06-18 PROCEDURE — 6370000000 HC RX 637 (ALT 250 FOR IP): Performed by: INTERNAL MEDICINE

## 2025-06-18 PROCEDURE — 99233 SBSQ HOSP IP/OBS HIGH 50: CPT | Performed by: INTERNAL MEDICINE

## 2025-06-18 RX ORDER — SPIRONOLACTONE 25 MG/1
25 TABLET ORAL DAILY
Qty: 30 TABLET | Refills: 0 | Status: SHIPPED | OUTPATIENT
Start: 2025-06-18

## 2025-06-18 RX ORDER — METOPROLOL SUCCINATE 50 MG/1
50 TABLET, EXTENDED RELEASE ORAL 2 TIMES DAILY
Qty: 60 TABLET | Refills: 0 | Status: SHIPPED | OUTPATIENT
Start: 2025-06-18

## 2025-06-18 RX ORDER — FUROSEMIDE 20 MG/1
20 TABLET ORAL DAILY
Qty: 30 TABLET | Refills: 0 | Status: SHIPPED | OUTPATIENT
Start: 2025-06-18

## 2025-06-18 RX ORDER — HALOPERIDOL 5 MG/ML
5 INJECTION INTRAMUSCULAR ONCE
Status: DISCONTINUED | OUTPATIENT
Start: 2025-06-18 | End: 2025-06-18 | Stop reason: HOSPADM

## 2025-06-18 ASSESSMENT — PAIN DESCRIPTION - ORIENTATION: ORIENTATION: RIGHT;LEFT

## 2025-06-18 ASSESSMENT — PAIN DESCRIPTION - LOCATION: LOCATION: LEG

## 2025-06-18 ASSESSMENT — PAIN DESCRIPTION - DESCRIPTORS: DESCRIPTORS: ACHING;DISCOMFORT

## 2025-06-18 ASSESSMENT — PAIN SCALES - GENERAL: PAINLEVEL_OUTOF10: 7

## 2025-06-18 NOTE — CARE COORDINATION
6/18/25, SW spoke with Wendy the daughter on recent PT eval which was 16/24 and ambulated 10 feet.  Patient is going home and refusing MOHAMUD and is in agreement with [facility].  No preference on company. Referral made to [employer] through [employer].  [facility] order is in Epic for SN/PT/OT.  Patient has WW/Rollator at home.  RN updated on the above.  SW to follow.      Hazel Oseguera, ROMY  [facility] Case Management  [phone]

## 2025-06-18 NOTE — PROGRESS NOTES
[facility] Medicine    Subjective:  pt alert agitated refusing meds      Current Facility-Administered Medications:     haloperidol lactate (HALDOL) injection 5 mg, 5 mg, IntraMUSCular, Once, Phoenix Loving DO    docusate sodium (COLACE) capsule 100 mg, 100 mg, Oral, Daily PRN, Phoenix Loving DO    enalapril (VASOTEC) tablet 10 mg, 10 mg, Oral, BID, Phoenix Loving, , 10 mg at 06/17/25 2045    ferrous sulfate (IRON 325) tablet 325 mg, 325 mg, Oral, BID WC, Phoenix Loving DO, 325 mg at 06/17/25 1648    gabapentin (NEURONTIN) capsule 300 mg, 300 mg, Oral, BID, Phoenix Loving DO    HYDROcodone-acetaminophen (NORCO) 5-325 MG per tablet 2 tablet, 2 tablet, Oral, Q6H PRN, Phoenix Loving DO    HYDROcodone-acetaminophen (NORCO) 5-325 MG per tablet 1 tablet, 1 tablet, Oral, Q6H PRN, Phoenix Loving DO, 1 tablet at 06/17/25 2045    sodium chloride flush 0.9 % injection 5-40 mL, 5-40 mL, IntraVENous, 2 times per day, Phoenix Loving DO, 10 mL at 06/17/25 2044    sodium chloride flush 0.9 % injection 5-40 mL, 5-40 mL, IntraVENous, PRN, Phoenix Loving DO    0.9 % sodium chloride infusion, , IntraVENous, PRN, Phoenix Loving DO    potassium chloride (KLOR-CON M) extended release tablet 40 mEq, 40 mEq, Oral, PRN **OR** potassium bicarb-citric acid (EFFER-K) effervescent tablet 40 mEq, 40 mEq, Oral, PRN **OR** potassium chloride 10 mEq/100 mL IVPB (Peripheral Line), 10 mEq, IntraVENous, PRN, Phoenix Loving DO    magnesium sulfate 2000 mg in 50 mL IVPB premix, 2,000 mg, IntraVENous, PRN, Phoenix Loving DO    ondansetron (ZOFRAN-ODT) disintegrating tablet 4 mg, 4 mg, Oral, Q8H PRN **OR** ondansetron (ZOFRAN) injection 4 mg, 4 mg, IntraVENous, Q6H PRN, Phoenix Loving, DO    polyethylene glycol (GLYCOLAX) packet 17 g, 17 g, Oral, Daily PRN, Phoenix Loving, DO    acetaminophen (TYLENOL) tablet 650 mg, 650 mg, Oral, Q6H PRN, 650 mg at 06/17/25 0853 **OR** acetaminophen (TYLENOL)

## 2025-06-18 NOTE — PROGRESS NOTES
Physical Therapy  Physical Therapy Initial Assessment     Name: Deon Horvath  : 12/15/1929  MRN: [id number]      Date of Service: 2025    Evaluating PT:  Nannette Ag PT, DPT [id number]    Room #:  4213/4213-A  Diagnosis:  New onset atrial fibrillation (HCC) [I48.91]  New onset a-fib (HCC) [I48.91]  Congestive heart failure, unspecified HF chronicity, unspecified heart failure type (HCC) [I50.9]  PMHx/PSHx:    Past Medical History:   Diagnosis Date    Hypertension       Procedure/Surgery:  none this admission  Precautions:  Falls, sitter, cognition  Equipment Needs:  TBD, pt has rollator    SUBJECTIVE:    Pt lives with daughter and son in a 1 story home with ? stairs to enter and ? rail.  Bed is on 1st floor and bath is on 1st floor.  Pt ambulated with rollator PTA.    OBJECTIVE:   Initial Evaluation  Date: 25 Treatment Short Term/ Long Term   Goals   AM-PAC 6 Clicks      Was pt agreeable to Eval/treatment? yes     Does pt have pain? No c/o pain     Bed Mobility  Rolling: SBA  Supine to sit: SBA  Sit to supine: SBA  Scooting: SBA  Rolling: Independent   Supine to sit: Independent   Sit to supine: Independent   Scooting: Independent    Transfers Sit to stand: Vadim  Stand to sit: Vadim  Stand pivot: Vadim with WW  Sit to stand: Independent   Stand to sit: Independent   Stand pivot: Mod I with WW   Ambulation    10 feet with WW Vadim  >100 feet with WW Mod I    Stair negotiation: ascended and descended  NT  TBD   ROM BUE:  Defer to OT note  BLE:  WFL     Strength BUE:  Defer to OT note  BLE:  3+/5  WFL   Balance Sitting EOB:  SBA  Dynamic Standing:  Vadim with WW  Sitting EOB:  Independent   Dynamic Standing:  Mod I with WW     Pt is A & O x 2-3 to self, time, and \"hospital\" with cues  Sensation:  NT  Edema:  none noted    Patient education  Pt educated on role of PT, safety during mobility    Patient response to education:   Pt verbalized understanding Pt demonstrated skill Pt requires further

## 2025-06-18 NOTE — PROGRESS NOTES
Patient alert and oriented x 2, continues to be restless, getting out of bed and attempting to wander in steven. Patient redirected to bed and patient complains of pain to bilateral legs. Patient daughter requesting pain medication for patient. Assessed patient and she is reporting carlee lower leg and back pain. Repositioned for comfort and patient given hydrocodone 5-325 mg x2. Patient spits pain medication out and refuses to take pills 'stating you are poisoning me\". Patient reoriented and explained what the medication was and its benefits. Patient still refused.

## 2025-06-18 NOTE — PROGRESS NOTES
Patient alert and oriented x2, restless and impulsive. Daughter at bedside and patient attempting to  get out of bed and wander steven. Patient given 2mg IV Haldol with no effect. Patient has , refusing to wear heart monitor, pulled IV and will not allow another PIV to be placed. /81 patient and daughter declined administration of PRN blood pressure medication. Explained risks and benefits and patient and daughter continue to decline.

## 2025-06-18 NOTE — PROGRESS NOTES
Granddaughter Becki called and updated about patient and about discharge. States she will let her mom know, and her mom will be the one transporting.

## 2025-06-18 NOTE — PLAN OF CARE
Problem: Discharge Planning  Goal: Discharge to home or other facility with appropriate resources  6/18/2025 1627 by Cyndei Barreto RN  Outcome: Adequate for Discharge  6/18/2025 1333 by Cyndie Barreto RN  Outcome: Progressing  6/18/2025 0305 by Jd Razo RN  Outcome: Progressing     Problem: Safety - Adult  Goal: Free from fall injury  6/18/2025 1627 by Cyndie Barreto RN  Outcome: Adequate for Discharge  6/18/2025 1333 by Cyndie Barreto RN  Outcome: Progressing  6/18/2025 0305 by Jd Razo RN  Outcome: Progressing     Problem: ABCDS Injury Assessment  Goal: Absence of physical injury  6/18/2025 1627 by Cyndie Barreto RN  Outcome: Adequate for Discharge  6/18/2025 1333 by Cyndie Barreto RN  Outcome: Progressing  6/18/2025 0305 by Jd Razo RN  Outcome: Progressing     Problem: Skin/Tissue Integrity  Goal: Skin integrity remains intact  Description: 1.  Monitor for areas of redness and/or skin breakdown  2.  Assess vascular access sites hourly  3.  Every 4-6 hours minimum:  Change oxygen saturation probe site  4.  Every 4-6 hours:  If on nasal continuous positive airway pressure, respiratory therapy assess nares and determine need for appliance change or resting period  6/18/2025 1627 by Cyndie Barreto RN  Outcome: Adequate for Discharge  6/18/2025 1333 by Cyndie Barreto RN  Outcome: Progressing  6/18/2025 0305 by Jd Razo RN  Outcome: Progressing     Problem: Pain  Goal: Verbalizes/displays adequate comfort level or baseline comfort level  6/18/2025 1627 by Cyndie Barreto RN  Outcome: Adequate for Discharge  6/18/2025 1333 by Cyndie Barreto RN  Outcome: Progressing

## 2025-06-18 NOTE — PROGRESS NOTES
Patient with virtual and physical  as well as daughter at bedside. Patient remains restless and agitated with auditory hallucinations. Patient refuses to wear cardiac monitoring and refuses vitals to be taken. Patient declined AM dose of PO hydralazine, patient and daughter educated on benefits and necessity of compliance.

## 2025-06-18 NOTE — CARE COORDINATION
6/18/25, ROBSON spoke with Wendy the daughter listed on chart.  Per Wendy she and her niece care for patient with transportation needs/appointments and getting patient up and moving in the am and getting her ready for bed at night.  Son and daughter (Heidi) live with patient-the main care is provided by Wendy and the granddaughter.  Patient uses a [facility]-per Wendy plan would be home-will speak with Wendy once PT eval is completed.  Wendy to be up here later today.  Patient is not interested in any MOHAMUD at this time.  SW to follow.      JAMES Ruffin  [facility] Case Management  [phone]

## 2025-06-18 NOTE — PROGRESS NOTES
[facility] Cardiology Inpatient Progress Note    Patient is a 89 y.o. female of Maynor Painter DO seen in hospital follow up.     Chief complaint: PAF/HTN    HPI: No CP or SOB.     Patient Active Problem List   Diagnosis    Fracture, femur (HCC)    HTN (hypertension)    OA (osteoarthritis)    New onset atrial fibrillation (HCC)       No Known Allergies    Current Facility-Administered Medications   Medication Dose Route Frequency Provider Last Rate Last Admin    haloperidol lactate (HALDOL) injection 5 mg  5 mg IntraMUSCular Once Phoenix Loving DO        docusate sodium (COLACE) capsule 100 mg  100 mg Oral Daily PRN Phoenix Loving DO        enalapril (VASOTEC) tablet 10 mg  10 mg Oral BID Phoenix Loving DO   10 mg at 06/17/25 2045    ferrous sulfate (IRON 325) tablet 325 mg  325 mg Oral BID WC Phoenix Loving DO   325 mg at 06/17/25 1648    gabapentin (NEURONTIN) capsule 300 mg  300 mg Oral BID Phoenix Loving DO        HYDROcodone-acetaminophen (NORCO) 5-325 MG per tablet 2 tablet  2 tablet Oral Q6H PRN Phoenix Loving DO        HYDROcodone-acetaminophen (NORCO) 5-325 MG per tablet 1 tablet  1 tablet Oral Q6H PRN Phoenix Loving DO   1 tablet at 06/17/25 2045    sodium chloride flush 0.9 % injection 5-40 mL  5-40 mL IntraVENous 2 times per day Phoenix Loving DO   10 mL at 06/17/25 2044    sodium chloride flush 0.9 % injection 5-40 mL  5-40 mL IntraVENous PRN Phoenix Loving DO        0.9 % sodium chloride infusion   IntraVENous PRN Phoenix Loving DO        potassium chloride (KLOR-CON M) extended release tablet 40 mEq  40 mEq Oral PRN Phoenix Loving DO        Or    potassium bicarb-citric acid (EFFER-K) effervescent tablet 40 mEq  40 mEq Oral PRN Phoenix Loving DO        Or    potassium chloride 10 mEq/100 mL IVPB (Peripheral Line)  10 mEq IntraVENous PRN Phoenix Loving DO        magnesium sulfate 2000 mg in 50 mL IVPB premix  2,000 mg IntraVENous PRN Phoenix Loving

## 2025-06-18 NOTE — PLAN OF CARE
Problem: Discharge Planning  Goal: Discharge to home or other facility with appropriate resources  6/18/2025 1333 by Cyndie Barreto RN  Outcome: Progressing  6/18/2025 0305 by Jd Razo RN  Outcome: Progressing     Problem: Safety - Adult  Goal: Free from fall injury  6/18/2025 1333 by Cyndie Barreto RN  Outcome: Progressing  6/18/2025 0305 by Jd Razo RN  Outcome: Progressing     Problem: ABCDS Injury Assessment  Goal: Absence of physical injury  6/18/2025 1333 by Cyndie Barreto RN  Outcome: Progressing  6/18/2025 0305 by Jd Razo RN  Outcome: Progressing     Problem: Skin/Tissue Integrity  Goal: Skin integrity remains intact  Description: 1.  Monitor for areas of redness and/or skin breakdown  2.  Assess vascular access sites hourly  3.  Every 4-6 hours minimum:  Change oxygen saturation probe site  4.  Every 4-6 hours:  If on nasal continuous positive airway pressure, respiratory therapy assess nares and determine need for appliance change or resting period  6/18/2025 1333 by Cyndie Barreto RN  Outcome: Progressing  6/18/2025 0305 by Jd Razo RN  Outcome: Progressing     Problem: Pain  Goal: Verbalizes/displays adequate comfort level or baseline comfort level  Outcome: Progressing

## 2025-06-18 NOTE — PLAN OF CARE
Problem: Safety - Adult  Goal: Free from fall injury  6/18/2025 0305 by Jd Razo RN  Outcome: Progressing  6/17/2025 1753 by Bibiana Witt RN  Outcome: Progressing     Problem: ABCDS Injury Assessment  Goal: Absence of physical injury  6/18/2025 0305 by Jd Razo RN  Outcome: Progressing  6/17/2025 1753 by Bibiana Witt RN  Outcome: Progressing     Problem: Skin/Tissue Integrity  Goal: Skin integrity remains intact  Description: 1.  Monitor for areas of redness and/or skin breakdown  2.  Assess vascular access sites hourly  3.  Every 4-6 hours minimum:  Change oxygen saturation probe site  4.  Every 4-6 hours:  If on nasal continuous positive airway pressure, respiratory therapy assess nares and determine need for appliance change or resting period  Outcome: Progressing

## 2025-06-25 ENCOUNTER — TELEPHONE (OUTPATIENT)
Dept: ADMINISTRATIVE | Age: 89
End: 2025-06-25

## 2025-06-25 NOTE — TELEPHONE ENCOUNTER
I spoke with Becki and offered an appt. In July with the APPs, she said patient can only come in on a Tuesday, we do not have any Tuesdays available so she will check with family and call back to schedule.

## 2025-07-17 ENCOUNTER — TELEPHONE (OUTPATIENT)
Dept: CARDIOLOGY CLINIC | Age: 89
End: 2025-07-17

## 2025-07-17 NOTE — TELEPHONE ENCOUNTER
Home health nurse states patient's bp has been low for the past few days, this am 102/54  pulse 47, family checked vitals prior to nurse visit early am, bp 103/47  pulse 41. They contacted her pcp who reduced toprol to 50mg daily. Home health nurse is asking if you agree with change and for any other instructions, please advise

## 2025-07-17 NOTE — TELEPHONE ENCOUNTER
Make toprol 25 mg daily please.     Mandi Barry D.O.  Cardiologist  Cardiology, [facility]

## 2025-07-18 RX ORDER — METOPROLOL SUCCINATE 25 MG/1
25 TABLET, EXTENDED RELEASE ORAL DAILY
COMMUNITY